# Patient Record
Sex: MALE | Race: WHITE | NOT HISPANIC OR LATINO | Employment: FULL TIME | URBAN - METROPOLITAN AREA
[De-identification: names, ages, dates, MRNs, and addresses within clinical notes are randomized per-mention and may not be internally consistent; named-entity substitution may affect disease eponyms.]

---

## 2024-09-24 ENCOUNTER — OFFICE VISIT (OUTPATIENT)
Dept: URGENT CARE | Age: 39
End: 2024-09-24
Payer: OTHER MISCELLANEOUS

## 2024-09-24 VITALS
RESPIRATION RATE: 16 BRPM | DIASTOLIC BLOOD PRESSURE: 92 MMHG | HEIGHT: 72 IN | SYSTOLIC BLOOD PRESSURE: 172 MMHG | WEIGHT: 210 LBS | OXYGEN SATURATION: 98 % | BODY MASS INDEX: 28.44 KG/M2 | HEART RATE: 110 BPM | TEMPERATURE: 98.3 F

## 2024-09-24 DIAGNOSIS — S61.217A LACERATION OF LEFT LITTLE FINGER WITHOUT FOREIGN BODY WITHOUT DAMAGE TO NAIL, INITIAL ENCOUNTER: Primary | ICD-10-CM

## 2024-09-24 RX ORDER — CEPHALEXIN 500 MG/1
500 CAPSULE ORAL 2 TIMES DAILY
Qty: 14 CAPSULE | Refills: 0 | Status: SHIPPED | OUTPATIENT
Start: 2024-09-24 | End: 2024-10-01

## 2024-09-24 RX ORDER — BUPRENORPHINE HYDROCHLORIDE AND NALOXONE HYDROCHLORIDE DIHYDRATE 8; 2 MG/1; MG/1
TABLET SUBLINGUAL
COMMUNITY

## 2024-09-24 RX ORDER — PROPRANOLOL HYDROCHLORIDE 20 MG/1
TABLET ORAL
COMMUNITY

## 2024-09-24 ASSESSMENT — PAIN SCALES - GENERAL: PAINLEVEL: 2

## 2024-09-24 ASSESSMENT — ENCOUNTER SYMPTOMS: EXTREMITY NUMBNESS: 0

## 2024-09-24 NOTE — PROGRESS NOTES
History provided by:  Patient  History limited by: na.   used: No    Laceration  Location:  Finger  Finger laceration location:  L little finger  Length:  2cm  Depth:  Cutaneous  Quality: jagged    Bleeding: controlled    Time since incident:  30 minutes  Laceration mechanism:  Knife  Pain details:     Quality:  Aching    Severity:  Mild    Timing:  Rare    Progression:  Unchanged  Foreign body present:  No foreign bodies  Relieved by:  Pressure  Worsened by:  Movement  Tetanus status:  Up to date  Associated symptoms: no numbness, no redness and no swelling        Physical Exam  Vitals and nursing note reviewed.   Constitutional:       Appearance: Normal appearance.   HENT:      Head: Normocephalic.      Right Ear: Tympanic membrane normal.      Left Ear: Tympanic membrane normal.      Nose: Nose normal.      Mouth/Throat:      Mouth: Mucous membranes are moist.   Eyes:      Pupils: Pupils are equal, round, and reactive to light.   Cardiovascular:      Rate and Rhythm: Normal rate and regular rhythm.   Pulmonary:      Effort: Pulmonary effort is normal.      Breath sounds: Normal breath sounds.   Abdominal:      General: Bowel sounds are normal.      Palpations: Abdomen is soft.   Musculoskeletal:      Left hand: Laceration and tenderness present.        Hands:    Skin:     General: Skin is warm and dry.      Capillary Refill: Capillary refill takes less than 2 seconds.   Neurological:      General: No focal deficit present.      Mental Status: He is alert.   Psychiatric:         Mood and Affect: Mood normal.       Laceration Repair    Date/Time: 9/24/2024 1:10 PM    Performed by: Crystal L Severino, APRN-CNP  Authorized by: Crystal L Severino, APRN-CNP    Consent:     Consent obtained:  Verbal    Consent given by:  Patient    Risks, benefits, and alternatives were discussed: yes      Risks discussed:  Infection and pain    Alternatives discussed:  No treatment and delayed  treatment  Anesthesia:     Anesthesia method:  Local infiltration    Local anesthetic:  Lidocaine 1% w/o epi  Laceration details:     Location:  Finger    Finger location:  L small finger    Length (cm):  2    Depth (mm):  2  Pre-procedure details:     Preparation:  Patient was prepped and draped in usual sterile fashion  Exploration:     Limited defect created (wound extended): no      Hemostasis achieved with:  Direct pressure    Wound exploration: wound explored through full range of motion      Contaminated: yes    Treatment:     Area cleansed with:  Soap and water and Shur-Clens    Amount of cleaning:  Standard    Visualized foreign bodies/material removed: no      Debridement:  None    Undermining:  None    Scar revision: no    Skin repair:     Repair method:  Sutures    Suture size:  5-0    Suture material:  Nylon  Approximation:     Approximation:  Close  Repair type:     Repair type:  Simple  Post-procedure details:     Dressing:  Antibiotic ointment and adhesive bandage    Procedure completion:  Tolerated well, no immediate complications          _____________MEDCO-14 Physician's Report of Work Ability [facility]-[id number]_________________      Injured Worker:  Date of injury: Claim number:   Jeremie Pham 9/24/24       Date of last appointment /examination: Date of this appointment /examination:  Date of next appointment /examination:        09/24/24        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  Please select one of the following options: I have never completed a MEDCO-14. Proceed to section 2.         Employment/Occupation (Complete this section and proceed to section 3)  2. Updates made to Employment/Occupation Section: No    Have you reviewed the description of the injured worker's job held on the date of the injury (former position of employment)? Yes, job description provided by: Injured worker       Work  Status/Injured worker's capabilities.   3a. Updates made to work status/injured worker's capabilities: No    Does the injured worker have any physical or health restrictions related to allowed conditions in the claim? No, the injured worker is released to work as of the date of this exam. Proceed to Section 8         Treating physician signature - mandatory:  8. I certify the information on this form is correct to the best of my knowledge. I am aware that any person who knowingly makes a false statement, misrepresentation, concealment of fact or any other act of fraud to obtain payment as provided by [facility], or who knowingly accepts payment to which that person is not entitled, is subject to felony criminal prosecution and may be punished, under appropriate criminal provisions. by a fine or imprisonment or both.     Treating physician's name (please print legibly): Crystal L Severino, APRN-CNP  [facility] provider ([place]) number: [phone]   Complete Address, Telephone, Fax number and Date:   [phone],  [place] [address]  Treating physician's signature: Crystal L Severino, APRN-CNP             Patient disposition: Home

## 2024-09-24 NOTE — PATIENT INSTRUCTIONS
Sutures need to come out in 7-10 days  Keep wound clean and dry  Antibiotic ointment and band-aid